# Patient Record
Sex: FEMALE | Race: WHITE
[De-identification: names, ages, dates, MRNs, and addresses within clinical notes are randomized per-mention and may not be internally consistent; named-entity substitution may affect disease eponyms.]

---

## 2020-07-08 ENCOUNTER — HOSPITAL ENCOUNTER (EMERGENCY)
Dept: HOSPITAL 75 - ER FS | Age: 25
Discharge: HOME | End: 2020-07-08
Payer: COMMERCIAL

## 2020-07-08 VITALS — SYSTOLIC BLOOD PRESSURE: 156 MMHG | DIASTOLIC BLOOD PRESSURE: 93 MMHG

## 2020-07-08 VITALS — HEIGHT: 66.93 IN | WEIGHT: 288.81 LBS | BODY MASS INDEX: 45.33 KG/M2

## 2020-07-08 DIAGNOSIS — S10.86XA: Primary | ICD-10-CM
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
## 2020-07-08 PROCEDURE — 99283 EMERGENCY DEPT VISIT LOW MDM: CPT

## 2020-07-08 NOTE — ED GENERAL
General


Chief Complaint:  Bite-Animal/Human/Insect


Stated Complaint:  TICK BITE ON BACK OF NECK





History of Present Illness


Date Seen by Provider:  Jul 8, 2020


Time Seen by Provider:  17:25


Initial Comments


24 yo female 


just noticed and removed a tick from her right occipital scalp this morning 


I can't tell from her history whether it was engorged or not


she says the site is sore and when specifically asked about headache and neck 

stiffness she says yes to both


she does not appear ill at all no fever


she is breast feeding





Allergies and Home Medications


Patient Home Medication List


Home Medication List Reviewed:  Yes





Review of Systems


Review of Systems


Constitutional:  no symptoms reported


EENTM:  other (sore area right occipital scalp)


Respiratory:  no symptoms reported


Cardiovascular:  no symptoms reported


Gastrointestinal:  no symptoms reported


Genitourinary:  no symptoms reported


Musculoskeletal:  no symptoms reported


Skin:  no symptoms reported





Past Medical-Social-Family Hx


Patient Social History


Recent Foreign Travel:  No


Contact w/Someone Who Travel:  No





Physical Exam


Vital Signs


Capillary Refill :


Height, Weight, BMI


Height: '"


Weight: lbs. oz. kg;  BMI


Method:


General Appearance:  No Apparent Distress


Eyes:  Bilateral Eye PERRL, Bilateral Eye EOMI


HEENT:  PERRL/EOMI, TMs Normal, Normal ENT Inspection, Pharynx Normal, Other 

(patient does have a slightly reddened and tender area in the right occipital 

scalp with a central rani)


Neck:  Supple


Respiratory:  Lungs Clear


Cardiovascular:  Regular Rate, Rhythm


Gastrointestinal:  Normal Bowel Sounds


Skin:  No Rash





Progress/Results/Core Measures


Suspected Sepsis


SIRS


Temperature: 


Pulse:  


Respiratory Rate: 


 


Blood Pressure  / 


Mean:





Results/Orders


Vital Signs/I&O


Capillary Refill :





Departure


Impression





   Primary Impression:  


   Tick bite


   Qualified Codes:  W57.XXXA - Bitten or stung by nonvenomous insect and other 

   nonvenomous arthropods, initial encounter


Disposition:  01 HOME, SELF-CARE


Condition:  Unchanged





Departure-Patient Inst.


Decision time for Depature:  17:35


Patient Instructions:  Rickettsial Infections (DC)


Scripts


Amoxicillin (Amoxicillin) 500 Mg Capsule


500 MG PO TID, #21 CAP 0 Refills


   Prov: RADHA CARROLL MD         7/8/20











RADHA CARROLL MD               Jul 8, 2020 17:33

## 2020-07-08 NOTE — XMS REPORT
Continuity of Care Document

                             Created on: 2020



OlivianetteNilda

External Reference #: [id number]

: 1995

Sex: Female



Demographics





                          Address                   [address] [place] [address]

 

                          Home Phone                [phone] x

 

                          Preferred Language        Unknown

 

                          Marital Status            Unknown

 

                          [employer] Affiliation     Unknown

 

                          Race                      Unknown

 

                          Ethnic Group              Unknown





Author





                          Organization              Unknown

 

                          Address                   Unknown

 

                          Phone                     Unavailable



              



Allergies

      



There is no data.                  



Medications

      



There is no data.                  



Problems

      



There is no data.                  



Procedures

      



There is no data.                  



Results

      



                    Test                Result              Range        

 

                                        TEST AUTHORIZATION - 19 10:33     

    

 

                    TEST NAME:            VITAMIN D,25-OH,TOTAL,IA            NR

G        

 

                    TEST CODE:            [id number]            NRG        

 

                    CLIENT CONTACT:           LUIS DANIEL SHORT               NRG        

 

                    REPORT ALWAYS MESSAGE SIGNATURE                             

  NRG        

 

                    COMMENT                                 NRG        



                



Encounters

      



                ACCT No.           Visit Date/Time           Discharge          

 Status         

             Pt. Type           Provider           Facility           Loc./Unit 

          

Complaint        

 

                [id number]           2019 10:40:00           2019 23:59:

59           CLS

                Outpatient                                           [facility]        

                                                 

 

             [id number]           2019 10:40:00                              

       Document

Registration                                                                    

 

                    [id number]           2020 17:22:00           

020 17:42:00        

                DIS             Emergency           CARLY RICK, RADHA VERDE          

 [facility]           ER FS                     TICK BITE ON BACK OF NE

CK